# Patient Record
Sex: MALE | Race: WHITE
[De-identification: names, ages, dates, MRNs, and addresses within clinical notes are randomized per-mention and may not be internally consistent; named-entity substitution may affect disease eponyms.]

---

## 2017-08-06 ENCOUNTER — HOSPITAL ENCOUNTER (EMERGENCY)
Dept: HOSPITAL 14 - H.ER | Age: 22
Discharge: HOME | End: 2017-08-06
Payer: COMMERCIAL

## 2017-08-06 VITALS
SYSTOLIC BLOOD PRESSURE: 147 MMHG | RESPIRATION RATE: 18 BRPM | HEART RATE: 98 BPM | OXYGEN SATURATION: 99 % | DIASTOLIC BLOOD PRESSURE: 82 MMHG | TEMPERATURE: 98.8 F
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
## 2017-08-06 DIAGNOSIS — S00.01XA: Primary | ICD-10-CM

## 2019-04-27 ENCOUNTER — HOSPITAL ENCOUNTER (EMERGENCY)
Dept: HOSPITAL 14 - H.ER | Age: 24
Discharge: HOME | End: 2019-04-27
Payer: SELF-PAY

## 2019-04-27 VITALS
HEART RATE: 83 BPM | SYSTOLIC BLOOD PRESSURE: 139 MMHG | TEMPERATURE: 98.6 F | RESPIRATION RATE: 18 BRPM | OXYGEN SATURATION: 97 % | DIASTOLIC BLOOD PRESSURE: 93 MMHG

## 2019-04-27 DIAGNOSIS — R06.00: Primary | ICD-10-CM

## 2019-04-27 DIAGNOSIS — F17.200: ICD-10-CM

## 2019-04-27 DIAGNOSIS — Z88.1: ICD-10-CM

## 2019-04-27 DIAGNOSIS — Z79.899: ICD-10-CM

## 2019-04-27 NOTE — ED PDOC
HPI: Chest Pain


Time Seen by Provider: 04/27/19 12:23


Chief Complaint (Nursing): Chest Pain


Chief Complaint (Provider): Chest Pain


History Per: Patient


History/Exam Limitations: no limitations


Onset/Duration Of Symptoms: Hrs


Current Symptoms Are (Timing): Still Present


Additional Complaint(s): 


Patient is a 22 y/o male with a PMHx of gastritis and sleep apnea as a child who

claims this morning he woke up coughing with SOB and mid-sternal, non-radiating 

chest pain. Patient states for the past week his girlfriend, who sleeps in the 

same bed as him, reports he has been breathing heavy at night. Patient denies 

fever, hemoptysis, trauma, leg pain or swelling, prolonged leg immobilization, 

hormonal treatment, and palpitations. Of note, patient is a heavy smoker.





PCP: None Provided





Past Medical History


Reviewed: Historical Data, Nursing Documentation, Vital Signs


Vital Signs: 





                                Last Vital Signs











Temp  98.6 F   04/27/19 12:17


 


Pulse  83   04/27/19 12:17


 


Resp  18   04/27/19 12:17


 


BP  139/93 H  04/27/19 12:17


 


Pulse Ox  97   04/27/19 12:17














- Medical History


PMH: Gastritis


   Denies: Diabetes, Deep Vein Thrombosis, HTN, Hypercholesterolemia, Pulmonary 

Embolism


Other PMH: Sleep Apnea





- Surgical History


Surgical History: No Surg Hx





- Family History


Family History: States: Hypertension


   Denies: MI





- Social History


Current smoker - smoking cessation education provided: Yes


Drugs: Denies (cocaine use), Cannabis





- Home Medications


Home Medications: 


                                Ambulatory Orders











 Medication  Instructions  Recorded


 


Pantoprazole [Protonix EC Tab] 20 mg PO DAILY #30 ect 09/06/14


 


Mupirocin 2% Ointment [Bactroban 1 appl TP BID #1 tube 08/06/17





Ointment]  


 


Albuterol HFA [Ventolin HFA 90 2 puff IH [id number] PRN #120 puff 04/27/19





mcg/actuation (8 g)]  














- Allergies


Allergies/Adverse Reactions: 


                                    Allergies











Allergy/AdvReac Type Severity Reaction Status Date / Time


 


azithromycin [From Zithromax] Allergy  RASH Verified 04/27/19 12:17














Wells Criteria for PE





- Wells Criteria for Pulmonary Embolism


Clinical Signs and Symptoms of DVT: No


P.E is #1 Diagnosis, or Equally Likely: No


Heart Rate >100: No


Immobilization at least 3 days;Surgery previous 4 weeks: No


Previous, objectively diagnosed PE or DVT: No


Hemoptysis: No


Malignancy w/treatment within 6 months, or palliative: No


Total Score: 0





Review of Systems


ROS Statement: Except As Marked, All Systems Reviewed And Found Negative


Constitutional: Negative for: Fever


Cardiovascular: Positive for: Chest Pain (mid-sternal, non-radiating).  Negative

for: Palpitations


Respiratory: Positive for: Cough, Other (heavy breathing).  Negative for: 

Hemoptysis


Musculoskeletal: Negative for: Leg Pain (or swelling)





Physical Exam





- Reviewed


Nursing Documentation Reviewed: Yes


Vital Signs Reviewed: Yes





- Physical Exam


Appears: Positive for: No Acute Distress (speaking full sentences)


Head Exam: Positive for: ATRAUMATIC, NORMAL INSPECTION, NORMOCEPHALIC


Skin: Positive for: Normal Color, Warm, DRY


Eye Exam: Positive for: EOMI, Normal appearance, PERRL


Neck: Positive for: Normal, Painless ROM, Supple


Cardiovascular/Chest: Positive for: Regular Rate, Rhythm.  Negative for: Murmur


Respiratory: Positive for: Normal Breath Sounds.  Negative for: Respiratory 

Distress


Extremity: Negative for: Calf Tenderness (or swelling b/l)


Neurological/Psych: Positive for: Alert, Oriented (x3)





- ECG


O2 Sat by Pulse Oximetry: 97 (RA)


Pulse Ox Interpretation: Normal





Medical Decision Making


Medical Decision Making: 


Time: 1230


Impression: Dyspnea


Plan:


CXR


Albuterol 2.5 mg INH


Peak Flow Pre/Post TX .Pre/Post Treatment


PERC - 0





Time: 1300


On reevaluation, good releif of dyspnea. Initial peak flow was 260. Post peak 

flow was 460. Advised to followup with [facility] for possible sleep study and further 

evaluation. Patient advised to stop smoking.





 

--------------------------------------------------------------------------------


-----------------


Scribe Attestation:


Documented by Liang Bonilla, acting as a scribe forJames Pormentilla PA-C. 





Provider Scribe Attestation:


All medical record entries made by the Scribe were at my direction and 

personally dictated by me. I have reviewed the chart and agree that the record 

accurately reflects my personal performance of the history, physical exam, 

medical decision making, and the department course for this patient. I have also

personally directed, reviewed, and agree with the discharge instructions and 

disposition.





Disposition





- Clinical Impression


Clinical Impression: 


 Dyspnea








- Patient ED Disposition


Is Patient to be Admitted: No





- Disposition


Referrals: 


[facility] [Outside]


Disposition: Routine/Home


Disposition Time: 13:15


Condition: IMPROVED


Additional Instructions: 


FOLLOW UP WITH [facility] FOR FURTHER EVALUATION


RETURN TO ED IMMEDIATELY IF SYMPTOMS WORSEN


STOP SMOKING





JUVE CAMPBELL, thank you for letting us take care of you today. Your provider was

 Rafi Huitron MD and you were treated for CHEST PAIN. The emergency 

medical care you received today was directed at your acute symptoms. If you were

 prescribed any medication, please fill it and take as directed. It may take 

several days for your symptoms to resolve. Return to the Emergency Department if

 your symptoms worsen, do not improve, or if you have any other problems.





Please contact your doctor or call one of the physicians/clinics you have been 

referred to that are listed on the Patient Visit Information form that is 

included in your discharge packet. Bring any paperwork you were given at 

discharge with you along with any medications you are taking to your follow up 

visit. Our treatment cannot replace ongoing medical care by a primary care 

provider outside of the emergency department.





Thank you for allowing the [facility] team to be part of your care today.








If you had an X-Ray or CT scan: A Radiologist will review the ED reading if any 

change in treatment is needed we will contact you.***





If you had a blood, urine, or wound culture: It will take several days for the 

results, if any change in treatment is needed we will contact you.***





If you had an STI test: It will take 48 hours for the results. Please call after

 1 week if you have not heard back.***


Prescriptions: 


Albuterol HFA [Ventolin HFA 90 mcg/actuation (8 g)] 2 puff IH [id number] PRN #120 

puff


 PRN Reason: Cough


Instructions:  Smoking: Not Just Harmful to Your Lungs and Heart, Shortness of 

Breath (Dyspnea) (DC), Quitting Smoking


Forms:  [facility] ED School/Work Excuse

## 2019-04-27 NOTE — RAD
Date of service: 



04/27/2019



HISTORY:

 SOB 



COMPARISON:

No prior.



TECHNIQUE:

Chest PA and lateral views



FINDINGS:



LUNGS:

No active pulmonary disease.



PLEURA:

No significant pleural effusion identified. No pneumothorax apparent.



CARDIOVASCULAR:

No aortic atherosclerotic calcification present.



Normal cardiac size. No pulmonary vascular congestion. 



OSSEOUS STRUCTURES:

No significant abnormalities.



VISUALIZED UPPER ABDOMEN:

Normal.



OTHER FINDINGS:

None.



IMPRESSION:

No active disease.